# Patient Record
Sex: FEMALE | Race: WHITE | Employment: FULL TIME | URBAN - METROPOLITAN AREA
[De-identification: names, ages, dates, MRNs, and addresses within clinical notes are randomized per-mention and may not be internally consistent; named-entity substitution may affect disease eponyms.]

---

## 2022-05-25 NOTE — TELEPHONE ENCOUNTER
DIAGNOSIS: left hand index finger injury/ doi approx 1mo / self ref / no images   APPOINTMENT DATE: 6.16.22   NOTES STATUS DETAILS

## 2022-06-13 DIAGNOSIS — M79.642 LEFT HAND PAIN: Primary | ICD-10-CM

## 2022-06-16 ENCOUNTER — OFFICE VISIT (OUTPATIENT)
Dept: ORTHOPEDICS | Facility: CLINIC | Age: 27
End: 2022-06-16
Payer: COMMERCIAL

## 2022-06-16 ENCOUNTER — PRE VISIT (OUTPATIENT)
Dept: ORTHOPEDICS | Facility: CLINIC | Age: 27
End: 2022-06-16

## 2022-06-16 DIAGNOSIS — S60.222A CONTUSION OF LEFT HAND, INITIAL ENCOUNTER: ICD-10-CM

## 2022-06-16 DIAGNOSIS — M79.642 LEFT HAND PAIN: Primary | ICD-10-CM

## 2022-06-16 PROCEDURE — 99203 OFFICE O/P NEW LOW 30 MIN: CPT | Performed by: PREVENTIVE MEDICINE

## 2022-06-16 NOTE — NURSING NOTE
Reason For Visit:   Chief Complaint   Patient presents with     Left Hand - Pain   Dislocated DIP of left index finger and now deformed. Happened about a month ago.     There were no vitals taken for this visit.         Divine Clifford ATC

## 2022-06-16 NOTE — LETTER
6/16/2022      RE: Kristin Farrell  [address]  [facility] [address]     Dear Colleague,    Thank you for referring your patient, Kristin Farrell, to the [facility] [place]. Please see a copy of my visit note below.    HISTORY OF PRESENT ILLNESS  Ms. Farrell is a pleasant 27 year old year old female who presents to clinic today with left hand pain   From an injury that occurred recently  Location: left hand  Quality:  achy pain    Severity:5/10 at worst      MEDICAL HISTORY  There is no problem list on file for this patient.      No current outpatient medications on file.       No Known Allergies    No family history on file.  Social History     Socioeconomic History     Marital status:        Additional medical/Social/Surgical histories reviewed in EPIC and updated as appropriate.     REVIEW OF SYSTEMS (6/16/2022)  10 point ROS of systems including Constitutional, Eyes, Respiratory, Cardiovascular, Gastroenterology, Genitourinary, Integumentary, Musculoskeletal, Psychiatric, Allergic/Immunologic were all negative except for pertinent positives noted in my HPI.     PHYSICAL EXAM  VSS  General  - normal appearance, in no obvious distress  HEENT  - conjunctivae not injected, moist mucous membranes, normocephalic/atraumatic head, ears normal appearance, no lesions, mouth normal appearance, no scars, normal dentition and teeth present    CV  - normal radial pulse  Pulm  - normal respiratory pattern, non-labored  Musculoskeletal -  Left hand  - inspection: no atrophy, normal joint alignment, no swelling  - palpation: no bony or soft tissue tenderness, no tenderness at the anatomical snuffbox  - ROM of fingers:  MCP 90 deg flexion   0 deg extension                  PIP           120 deg flexion  0 deg extension                     DIP 80 deg flexion   0 deg extension  - strength: 5/5  strength, 5/5 wrist abduction, 5/5 flexion, extension, pronation, supination,  adduction  - special tests:  (-) varus  (-) valgus  Neuro  - no numbness, no motor deficit, grossly normal coordination, normal muscle tone  Skin  - no ecchymosis, erythema, warmth, or induration, no obvious rash  Psych  - interactive, appropriate, normal mood and affect  ASSESSMENT & PLAN   26 yo female with left hand pain due to contusion    I independently reviewed the following imaging studies:  Xray hand: has no fractures  Given exercises  nsaids PRN  F/u PRN If worsens  Appropriate PPE was utilized for prevention of spread of Covid-19.  Jim Clark MD, CAQSM        Again, thank you for allowing me to participate in the care of your patient.      Sincerely,    Jim Clark MD

## 2022-06-16 NOTE — LETTER
Date:June 27, 2022      Patient was self referred, no letter generated. Do not send.        [facility] Health Information

## 2022-06-16 NOTE — PROGRESS NOTES
HISTORY OF PRESENT ILLNESS  Ms. Farrell is a pleasant 27 year old year old female who presents to clinic today with left hand pain   From an injury that occurred recently  Location: left hand  Quality:  achy pain    Severity:5/10 at worst      MEDICAL HISTORY  There is no problem list on file for this patient.      No current outpatient medications on file.       No Known Allergies    No family history on file.  Social History     Socioeconomic History     Marital status:        Additional medical/Social/Surgical histories reviewed in [facility] and updated as appropriate.     REVIEW OF SYSTEMS (6/16/2022)  10 point ROS of systems including Constitutional, Eyes, Respiratory, Cardiovascular, Gastroenterology, Genitourinary, Integumentary, Musculoskeletal, Psychiatric, Allergic/Immunologic were all negative except for pertinent positives noted in my HPI.     PHYSICAL EXAM  VSS  General  - normal appearance, in no obvious distress  HEENT  - conjunctivae not injected, moist mucous membranes, normocephalic/atraumatic head, ears normal appearance, no lesions, mouth normal appearance, no scars, normal dentition and teeth present    CV  - normal radial pulse  Pulm  - normal respiratory pattern, non-labored  Musculoskeletal -  Left hand  - inspection: no atrophy, normal joint alignment, no swelling  - palpation: no bony or soft tissue tenderness, no tenderness at the anatomical snuffbox  - ROM of fingers:  MCP 90 deg flexion   0 deg extension                  PIP           120 deg flexion  0 deg extension                     DIP 80 deg flexion   0 deg extension  - strength: 5/5  strength, 5/5 wrist abduction, 5/5 flexion, extension, pronation, supination, adduction  - special tests:  (-) varus  (-) valgus  Neuro  - no numbness, no motor deficit, grossly normal coordination, normal muscle tone  Skin  - no ecchymosis, erythema, warmth, or induration, no obvious rash  Psych  - interactive, appropriate, normal mood and  affect  ASSESSMENT & PLAN   28 yo female with left hand pain due to contusion    I independently reviewed the following imaging studies:  Xray hand: has no fractures  Given exercises  nsaids PRN  F/u PRN If worsens  Appropriate PPE was utilized for prevention of spread of Covid-19.  Jim Clark MD, CAM

## 2022-07-24 ENCOUNTER — HEALTH MAINTENANCE LETTER (OUTPATIENT)
Age: 27
End: 2022-07-24

## 2022-10-03 ENCOUNTER — HEALTH MAINTENANCE LETTER (OUTPATIENT)
Age: 27
End: 2022-10-03

## 2023-08-13 ENCOUNTER — HEALTH MAINTENANCE LETTER (OUTPATIENT)
Age: 28
End: 2023-08-13

## 2024-08-11 ENCOUNTER — OFFICE VISIT (OUTPATIENT)
Dept: URGENT CARE | Facility: URGENT CARE | Age: 29
End: 2024-08-11
Payer: COMMERCIAL

## 2024-08-11 VITALS
HEART RATE: 72 BPM | OXYGEN SATURATION: 97 % | SYSTOLIC BLOOD PRESSURE: 115 MMHG | DIASTOLIC BLOOD PRESSURE: 67 MMHG | TEMPERATURE: 98.5 F | RESPIRATION RATE: 16 BRPM

## 2024-08-11 DIAGNOSIS — L08.9 LOCAL INFECTION OF SKIN AND SUBCUTANEOUS TISSUE: Primary | ICD-10-CM

## 2024-08-11 PROCEDURE — 99203 OFFICE O/P NEW LOW 30 MIN: CPT | Performed by: FAMILY MEDICINE

## 2024-08-11 RX ORDER — SULFAMETHOXAZOLE/TRIMETHOPRIM 800-160 MG
1 TABLET ORAL 2 TIMES DAILY
Qty: 10 TABLET | Refills: 0 | Status: SHIPPED | OUTPATIENT
Start: 2024-08-11 | End: 2024-08-16

## 2024-08-11 NOTE — PROGRESS NOTES
Assessment & Plan     Local infection of skin and subcutaneous tissue  - sulfamethoxazole-trimethoprim (BACTRIM DS) 800-160 MG tablet  Dispense: 10 tablet; Refill: 0      Likely used low molar concentration HCL acid which will do a small exfoliation of her skin  Advised in future to avoid caustic solutions as antiseptic marketed items tend to damage more than provide actual benefit ( including hydrogen peroxide and alcohol)     There is also concern that tiny scales or fragments with the  fish may be in similar places of her other fingers no palpable foreign bodies were felt. Withthis being organic matter likely body will decomposed this without issue return as needed symptoms    Scooter Shaw MD   [place] [facility]    Subjective     Kristin is a 29 year old female who presents to clinic today for the following health issues:  Chief Complaint   Patient presents with    Urgent Care    Infection     Patient presents with her fingers infected after  filleting a fish on a canoeing trip she was on. Patients middle right finger is now turning orange.     HPI    Patient is accompanied by her partner today he does return from a 10-hour car ride from [place] where they recently went on a canoeing trip with other friends couple days ago she was playing a fish when part of the segments to get into a couple of her digits the third right finger is a 1 that is bothering her the most she tried using antiseptic colostomy underneath her fingernail was able to remove some of it a friend on the trip gave her a solution that was believed to be hydrochloric acid since using this it has caused yellowing color of her finger although she has intact sensation and movement.  She has not had any fevers.      There are no problems to display for this patient.      Current Outpatient Medications   Medication Sig Dispense Refill    sulfamethoxazole-trimethoprim (BACTRIM DS) 800-160 MG tablet Take 1 tablet by mouth 2 times daily for 5  days 10 tablet 0     No current facility-administered medications for this visit.           Objective    /67 (BP Location: Right arm)   Pulse 72   Temp 98.5  F (36.9  C) (Oral)   Resp 16   SpO2 97%   Physical Exam         R middle finger: swelling minor of the dorsal side along nailbed, no active drainage, intact movement of digit in flexion/extension    No results found for any visits on 08/11/24.                  The use of Dragon/S-cubism dictation services may have been used to construct the content in this note; any grammatical or spelling errors are non-intentional. Please contact the author of this note directly if you are in need of any clarification.

## 2024-08-11 NOTE — PATIENT INSTRUCTIONS
Antibiotic take twice a day for 5 days to cover for infection      If symptoms worsen please return to be evaluated this includes limitation in movement of finger, redness, swelling, fever      Optional warm water soaks and Epsom salt 1-2 times a day      In the future avoid acid, hydrogen peroxide, rubbing alcohol to clean out wounds. Instead use saline/distilled/or soapy water

## 2024-08-19 ENCOUNTER — TRANSFERRED RECORDS (OUTPATIENT)
Dept: HEALTH INFORMATION MANAGEMENT | Facility: CLINIC | Age: 29
End: 2024-08-19
Payer: COMMERCIAL

## 2024-08-19 ENCOUNTER — MEDICAL CORRESPONDENCE (OUTPATIENT)
Dept: HEALTH INFORMATION MANAGEMENT | Facility: CLINIC | Age: 29
End: 2024-08-19
Payer: COMMERCIAL

## 2024-08-19 LAB — PHQ9 SCORE: 3

## 2024-08-20 ENCOUNTER — TELEPHONE (OUTPATIENT)
Dept: ALLERGY | Facility: CLINIC | Age: 29
End: 2024-08-20
Payer: COMMERCIAL

## 2024-08-20 NOTE — TELEPHONE ENCOUNTER
8/20 left a message regarding the  referral from [facility] for penicillin allergy. Need a consult with Allergist any MD's.   
18-Oct-2021 14:42

## 2024-08-26 ENCOUNTER — TELEPHONE (OUTPATIENT)
Dept: ALLERGY | Facility: CLINIC | Age: 29
End: 2024-08-26
Payer: COMMERCIAL

## 2024-08-26 NOTE — TELEPHONE ENCOUNTER
8/26 left a message regarding referral form [facility] for Penicillin Allergy. Patient need to see the MD's.

## 2024-09-06 ENCOUNTER — TELEPHONE (OUTPATIENT)
Dept: ALLERGY | Facility: CLINIC | Age: 29
End: 2024-09-06
Payer: COMMERCIAL

## 2024-09-06 NOTE — TELEPHONE ENCOUNTER
9/6 left a message regarding the referral from [facility] for Penicillin Allergy.

## 2024-10-06 ENCOUNTER — HEALTH MAINTENANCE LETTER (OUTPATIENT)
Age: 29
End: 2024-10-06